# Patient Record
Sex: FEMALE | Race: WHITE | NOT HISPANIC OR LATINO
[De-identification: names, ages, dates, MRNs, and addresses within clinical notes are randomized per-mention and may not be internally consistent; named-entity substitution may affect disease eponyms.]

---

## 2022-02-23 PROBLEM — Z00.00 ENCOUNTER FOR PREVENTIVE HEALTH EXAMINATION: Status: ACTIVE | Noted: 2022-02-23

## 2022-02-25 ENCOUNTER — APPOINTMENT (OUTPATIENT)
Dept: ENDOCRINOLOGY | Facility: CLINIC | Age: 22
End: 2022-02-25
Payer: COMMERCIAL

## 2022-02-25 VITALS
HEIGHT: 66 IN | OXYGEN SATURATION: 97 % | SYSTOLIC BLOOD PRESSURE: 110 MMHG | WEIGHT: 143 LBS | BODY MASS INDEX: 22.98 KG/M2 | DIASTOLIC BLOOD PRESSURE: 70 MMHG | HEART RATE: 102 BPM

## 2022-02-25 DIAGNOSIS — F41.9 ANXIETY DISORDER, UNSPECIFIED: ICD-10-CM

## 2022-02-25 DIAGNOSIS — N92.0 EXCESSIVE AND FREQUENT MENSTRUATION WITH REGULAR CYCLE: ICD-10-CM

## 2022-02-25 PROCEDURE — 99204 OFFICE O/P NEW MOD 45 MIN: CPT

## 2022-03-10 ENCOUNTER — TRANSCRIPTION ENCOUNTER (OUTPATIENT)
Age: 22
End: 2022-03-10

## 2022-03-20 NOTE — HISTORY OF PRESENT ILLNESS
[FreeTextEntry1] : Patient here for evaluation of her thyroid.\par Patient concerned because sister just diagnosed with extensive papillary thyroid cancer and patient also has an aunt who has thyroid cancer.\par \par In addition patient concerned about weight gain.  And very sensitive to many different medications\par Patient reports to be feeling overall okay\par Past medical history\par Anxiety\par Depression\par Asthma\par Eczema\par Concussion in January 2018\par Vertigo\par GERD without esophagitis, intermittent laryngospasm from LPR and GERD\par MTHFR mutation\par \par \par GYN history gynn   \par Patient went through normal puberty, menarche age 12 cycles initially every 40 days with OCP became regular patient has been off OCP recently and the periods are regular but heavy, OCPs have caused mood swings for the patient\par Past surgical history cauterization of left nostril with Dr. Bennett\par Laparoscopy for possible endometriosis which turned out to be negative\par \par Family history \par MALIGNANT hyperthermia severe reaction to volatile anesthetic agents and succinylcholine neuromuscular blocking agent\par Father obesity, skin cancer\par Mother obesity prediabetes\par Sister thyroid cancer\par Brother healthy\par Grandfather diabetes pancreatic cancer\par Aunt thyroid cancer\par Other grandfather prostate cancer\par Uncle bladder cancer\par \par Social history patient was born and raised in [place]\par No exposures to chemicals or radiation\par Patient is non-smoker\par Social alcohol use\par \par Allergies include\par Cefdinir\par Hibiclens\par Eggs\par Nuts\par Tree nuts including Shea\par Cherries\par Zantac\par Atrovent, Combivent\par Low Loestrin Fe if severe mood swings\par \par \par Medications\par Buspirone 15 mg twice daily\par Claritin 10 mg once per day\par Lexapro 10 mg once per day\par Folic acid\par Iron\par Sertraline 50 mg once per day\par Vitamin D 2000 IUs once per day\par As needed medications include\par Clonazepam 0.25 mg 3 times daily as needed\par Meclizine 12.5 mg 3 times daily as needed vertigo\par Naproxen sodium 550 mg every 8-12 hours as needed menstrual pain\par Benadryl 12.5 to 25 mg every 4 hour as needed allergic reaction\par ProAir every 4 hours as needed\par EpiPen as needed for anaphylaxis\par

## 2022-03-20 NOTE — ASSESSMENT
[FreeTextEntry1] : Patient with family history of thyroid cancer here for evaluation for thyroid as well as weight gain\par 1.  Thyroid\par Check thyroid ultrasound\par Check thyroid antibodies including antithyroglobulin and antithyroperoxidase, as well as TSI\par \par Weight gain\par Patient off OCP\par Check full hormonal panel

## 2022-03-20 NOTE — PHYSICAL EXAM
[Alert] : alert [Well Nourished] : well nourished [Well Developed] : well developed [No Acute Distress] : no acute distress [Normal Sclera/Conjunctiva] : normal sclera/conjunctiva [EOMI] : extra ocular movement intact [No Proptosis] : no proptosis [Normal Oropharynx] : the oropharynx was normal [Thyroid Not Enlarged] : the thyroid was not enlarged [No Thyroid Nodules] : no palpable thyroid nodules [No Accessory Muscle Use] : no accessory muscle use [No Respiratory Distress] : no respiratory distress [Clear to Auscultation] : lungs were clear to auscultation bilaterally [Normal S1, S2] : normal S1 and S2 [Normal Rate] : heart rate was normal [Regular Rhythm] : with a regular rhythm [No Edema] : no peripheral edema [Pedal Pulses Normal] : the pedal pulses are present [Normal Anterior Cervical Nodes] : no anterior cervical lymphadenopathy [Normal Posterior Cervical Nodes] : no posterior cervical lymphadenopathy [No Spinal Tenderness] : no spinal tenderness [Spine Straight] : spine straight [No Stigmata of Cushings Syndrome] : no stigmata of Cushings Syndrome [Normal Gait] : normal gait [Normal Strength/Tone] : muscle strength and tone were normal [No Rash] : no rash [Acanthosis Nigricans] : no acanthosis nigricans [Normal Reflexes] : deep tendon reflexes were 2+ and symmetric [No Tremors] : no tremors [Oriented x3] : oriented to person, place, and time

## 2022-04-02 ENCOUNTER — TRANSCRIPTION ENCOUNTER (OUTPATIENT)
Age: 22
End: 2022-04-02

## 2022-04-29 ENCOUNTER — NON-APPOINTMENT (OUTPATIENT)
Age: 22
End: 2022-04-29

## 2022-07-12 ENCOUNTER — APPOINTMENT (OUTPATIENT)
Dept: ENDOCRINOLOGY | Facility: CLINIC | Age: 22
End: 2022-07-12

## 2022-07-12 VITALS
HEIGHT: 66 IN | HEART RATE: 97 BPM | OXYGEN SATURATION: 98 % | DIASTOLIC BLOOD PRESSURE: 72 MMHG | SYSTOLIC BLOOD PRESSURE: 114 MMHG | BODY MASS INDEX: 24.11 KG/M2 | WEIGHT: 150 LBS

## 2022-07-12 PROCEDURE — 99214 OFFICE O/P EST MOD 30 MIN: CPT

## 2022-07-12 RX ORDER — LORATADINE 5 MG/5 ML
10 SOLUTION, ORAL ORAL
Refills: 0 | Status: ACTIVE | COMMUNITY

## 2022-07-12 RX ORDER — MECLIZINE HYDROCHLORIDE 25 MG/1
25 TABLET ORAL
Qty: 60 | Refills: 0 | Status: ACTIVE | COMMUNITY
Start: 2022-06-06

## 2022-07-12 RX ORDER — BLOOD SUGAR DIAGNOSTIC
STRIP MISCELLANEOUS
Qty: 8 | Refills: 0 | Status: ACTIVE | COMMUNITY
Start: 2022-02-04

## 2022-07-12 RX ORDER — NORETHINDRONE 0.35 MG/1
0.35 TABLET ORAL
Qty: 28 | Refills: 0 | Status: ACTIVE | COMMUNITY
Start: 2022-03-21

## 2022-07-12 RX ORDER — PNV NO.95/FERROUS FUM/FOLIC AC 28MG-0.8MG
TABLET ORAL
Refills: 0 | Status: ACTIVE | COMMUNITY

## 2022-07-12 RX ORDER — IRON/IRON ASP GLY/FA/MV-MIN 38 125-25-1MG
TABLET ORAL
Refills: 0 | Status: ACTIVE | COMMUNITY

## 2022-07-12 RX ORDER — CLONAZEPAM 0.25 MG/1
0.25 TABLET, ORALLY DISINTEGRATING ORAL
Qty: 270 | Refills: 0 | Status: ACTIVE | COMMUNITY
Start: 2022-06-30

## 2022-07-12 RX ORDER — CHOLECALCIFEROL (VITAMIN D3) 25 MCG
TABLET ORAL
Refills: 0 | Status: ACTIVE | COMMUNITY

## 2022-07-12 NOTE — HISTORY OF PRESENT ILLNESS
[FreeTextEntry1] : follow up MNG s/p FNA of right nodule  AUS  40 % chane  indetermintate - for right lobecotmy\par \par  pt seen by Dr Tinajero [facility]  2ndopinion   found 2  small nodule s on left -  still greed for right thryoidectomy \par Patient concerned because sister just diagnosed with extensive papillary thyroid cancer and patient also has an aunt who has thyroid cancer.\par \par on Vanessa OCP now for menses --  doing ok \par Patient reports to be feeling overall okay\par Past medical history\par Anxiety\par Depression\par Asthma\par Eczema\par Concussion in January 2018\par Vertigo\par GERD without esophagitis, intermittent laryngospasm from LPR and GERD\par MTHFR mutation\par \par \par GYN history gynn   \par Patient went through normal puberty, menarche age 12 cycles initially every 40 days with OCP became regular patient has been off OCP recently and the periods are regular but heavy, OCPs have caused mood swings for the patient\par Past surgical history cauterization of left nostril with Dr. Bennett\par Laparoscopy for possible endometriosis which turned out to be negative\par \par Family history \par MALIGNANT hyperthermia severe reaction to volatile anesthetic agents and succinylcholine neuromuscular blocking agent\par Father obesity, skin cancer\par Mother obesity prediabetes\par Sister thyroid cancer\par Brother healthy\par Grandfather diabetes pancreatic cancer\par Aunt thyroid cancer\par Other grandfather prostate cancer\par Uncle bladder cancer\par \par Social history patient was born and raised in [place]\par No exposures to chemicals or radiation\par Patient is non-smoker\par Social alcohol use\par \par Allergies include\par Cefdinir\par Hibiclens\par Eggs\par Nuts\par Tree nuts including Shea\par Cherries\par Zantac\par Atrovent, Combivent\par Low Loestrin Fe if severe mood swings\par \par \par Medications\par Buspirone 15 mg twice daily\par Claritin 10 mg once per day\par Lexapro 10 mg once per day\par Folic acid\par Iron\par Sertraline 50 mg once per day\par Vitamin D 2000 IUs once per day\par As needed medications include\par Clonazepam 0.25 mg 3 times daily as needed\par Meclizine 12.5 mg 3 times daily as needed vertigo\par Naproxen sodium 550 mg every 8-12 hours as needed menstrual pain\par Benadryl 12.5 to 25 mg every 4 hour as needed allergic reaction\par ProAir every 4 hours as needed\par EpiPen as needed for anaphylaxis\par

## 2022-07-12 NOTE — ASSESSMENT
[FreeTextEntry1] : \par \par MNG -  AUS  right nodule with 40%- intermediate rissk\par   for right hemithryoidectomy with Dr Olivas\par  new noduel on left found with Dr Reza  --will get US reprot\par    will chewkc labs 4 week post op\par \par  dw ob gyn and Dr richardson stopping OCP _POP  prior  to surgery - bartkly will need to holsd 1 week  before \par \par

## 2022-09-09 ENCOUNTER — APPOINTMENT (OUTPATIENT)
Dept: ENDOCRINOLOGY | Facility: CLINIC | Age: 22
End: 2022-09-09

## 2022-09-09 VITALS
OXYGEN SATURATION: 98 % | SYSTOLIC BLOOD PRESSURE: 110 MMHG | BODY MASS INDEX: 23.79 KG/M2 | HEART RATE: 108 BPM | DIASTOLIC BLOOD PRESSURE: 70 MMHG | HEIGHT: 68 IN | WEIGHT: 157 LBS

## 2022-09-09 LAB — TSH SERPL-ACNC: 5.76

## 2022-09-09 PROCEDURE — 99214 OFFICE O/P EST MOD 30 MIN: CPT

## 2022-09-30 NOTE — ASSESSMENT
[FreeTextEntry1] : \par \par MNG -  s/p right thryoidectomy -benign pathology\par \par  mild HYpothryodism  start Syntrhoid 0.025 mg qd\par    some fatigue and anxiety- w liekly related to schedul disruption forom injury \par   willsee pMD  \par \par  dw ob gyn and Dr richardson stopping OCP _POP  prior  to surgery - liekly will need to holsd 1 week  before \par \par

## 2022-09-30 NOTE — HISTORY OF PRESENT ILLNESS
[FreeTextEntry1] : follow up MNG s/p FNA of right nodule  AUS  40 % chane  indetermintate - had right lobctomy  july 26 2022 \par  has hasd some anxiety\par \par developed injury to left leg-  now not in yesika  ahs been very upset \par not sleeping etc \par \par on Vanessa OCP now for menses --  doing ok \par Patient reports to be feeling overall okay\par Past medical history\par Anxiety\par Depression\par Asthma\par Eczema\par Concussion in January 2018\par Vertigo\par GERD without esophagitis, intermittent laryngospasm from LPR and GERD\par MTHFR mutation\par \par \par GYN history gynn   \par Patient went through normal puberty, menarche age 12 cycles initially every 40 days with OCP became regular patient has been off OCP recently and the periods are regular but heavy, OCPs have caused mood swings for the patient\par Past surgical history cauterization of left nostril with Dr. Bennett\par Laparoscopy for possible endometriosis which turned out to be negative\par \par Family history \par MALIGNANT hyperthermia severe reaction to volatile anesthetic agents and succinylcholine neuromuscular blocking agent\par Father obesity, skin cancer\par Mother obesity prediabetes\par Sister thyroid cancer\par Brother healthy\par Grandfather diabetes pancreatic cancer\par Aunt thyroid cancer\par Other grandfather prostate cancer\par Uncle bladder cancer\par \par Social history patient was born and raised in [place]\par No exposures to chemicals or radiation\par Patient is non-smoker\par Social alcohol use\par \par Allergies include\par Cefdinir\par Hibiclens\par Eggs\par Nuts\par Tree nuts including Shea\par Cherries\par Zantac\par Atrovent, Combivent\par Low Loestrin Fe if severe mood swings\par \par \par Medications\par Buspirone 15 mg twice daily\par Claritin 10 mg once per day\par Lexapro 10 mg once per day\par Folic acid\par Iron\par Sertraline 50 mg once per day\par Vitamin D 2000 IUs once per day\par As needed medications include\par Clonazepam 0.25 mg 3 times daily as needed\par Meclizine 12.5 mg 3 times daily as needed vertigo\par Naproxen sodium 550 mg every 8-12 hours as needed menstrual pain\par Benadryl 12.5 to 25 mg every 4 hour as needed allergic reaction\par ProAir every 4 hours as needed\par EpiPen as needed for anaphylaxis\par

## 2022-10-31 ENCOUNTER — APPOINTMENT (OUTPATIENT)
Dept: ENDOCRINOLOGY | Facility: CLINIC | Age: 22
End: 2022-10-31

## 2022-10-31 VITALS
OXYGEN SATURATION: 97 % | BODY MASS INDEX: 24.86 KG/M2 | HEIGHT: 68 IN | HEART RATE: 108 BPM | DIASTOLIC BLOOD PRESSURE: 70 MMHG | SYSTOLIC BLOOD PRESSURE: 110 MMHG | WEIGHT: 164 LBS

## 2022-10-31 PROCEDURE — 99214 OFFICE O/P EST MOD 30 MIN: CPT

## 2022-11-04 NOTE — ASSESSMENT
[FreeTextEntry1] : \par \par MNG -  s/p right thryoidectomy -benign pathology\par \par  mild HYpothryodism  increase to 1  tab qod alt with 2 tab qod \par  D  \par \par  \par \par

## 2022-11-04 NOTE — HISTORY OF PRESENT ILLNESS
[FreeTextEntry1] : follow up MNG s/p FNA of right nodule  AUS  40 % chane  indetermintate - had right lobctomy  july 26 2022 \par  has hasd some anxiety\par \par developed injury to left leg-  now not in yesika  ahs been very upset \par not sleeping etc \par \par on Vanessa OCP now for menses --  doing ok \par Patient reports to be feeling overall okay\par Past medical history\par Anxiety\par Depression\par Asthma\par Eczema\par Concussion in January 2018\par Vertigo\par GERD without esophagitis, intermittent laryngospasm from LPR and GERD\par MTHFR mutation\par \par \par GYN history gynn   \par Patient went through normal puberty, menarche age 12 cycles initially every 40 days with OCP became regular patient has been off OCP recently and the periods are regular but heavy, OCPs have caused mood swings for the patient\par Past surgical history cauterization of left nostril with Dr. Bennett\par Laparoscopy for possible endometriosis which turned out to be negative\par \par Family history \par MALIGNANT hyperthermia severe reaction to volatile anesthetic agents and succinylcholine neuromuscular blocking agent\par Father obesity, skin cancer\par Mother obesity prediabetes\par Sister thyroid cancer\par Brother healthy\par Grandfather diabetes pancreatic cancer\par Aunt thyroid cancer\par Other grandfather prostate cancer\par Uncle bladder cancer\par \par Social history patient was born and raised in [place]\par No exposures to chemicals or radiation\par Patient is non-smoker\par Social alcohol use\par \par Allergies include\par Cefdinir\par Hibiclens\par Eggs\par Nuts\par Tree nuts including Shea\par Cherries\par Zantac\par Atrovent, Combivent\par Low Loestrin Fe if severe mood swings\par \par \par Medications\par Buspirone 15 mg twice daily\par Claritin 10 mg once per day\par Lexapro 10 mg once per day\par Folic acid\par Iron\par Sertraline 50 mg once per day\par Vitamin D 2000 IUs once per day\par As needed medications include\par Clonazepam 0.25 mg 3 times daily as needed\par Meclizine 12.5 mg 3 times daily as needed vertigo\par Naproxen sodium 550 mg every 8-12 hours as needed menstrual pain\par Benadryl 12.5 to 25 mg every 4 hour as needed allergic reaction\par ProAir every 4 hours as needed\par EpiPen as needed for anaphylaxis\par

## 2023-01-20 ENCOUNTER — APPOINTMENT (OUTPATIENT)
Dept: RHEUMATOLOGY | Facility: CLINIC | Age: 23
End: 2023-01-20
Payer: COMMERCIAL

## 2023-01-20 ENCOUNTER — LABORATORY RESULT (OUTPATIENT)
Age: 23
End: 2023-01-20

## 2023-01-20 VITALS
DIASTOLIC BLOOD PRESSURE: 73 MMHG | SYSTOLIC BLOOD PRESSURE: 111 MMHG | WEIGHT: 170 LBS | HEIGHT: 68 IN | HEART RATE: 86 BPM | OXYGEN SATURATION: 97 % | BODY MASS INDEX: 25.76 KG/M2 | TEMPERATURE: 97.1 F

## 2023-01-20 DIAGNOSIS — M62.838 OTHER MUSCLE SPASM: ICD-10-CM

## 2023-01-20 DIAGNOSIS — Z82.61 FAMILY HISTORY OF ARTHRITIS: ICD-10-CM

## 2023-01-20 DIAGNOSIS — M25.562 PAIN IN LEFT KNEE: ICD-10-CM

## 2023-01-20 DIAGNOSIS — M89.9 DISORDER OF BONE, UNSPECIFIED: ICD-10-CM

## 2023-01-20 DIAGNOSIS — G89.29 PAIN IN LEFT KNEE: ICD-10-CM

## 2023-01-20 DIAGNOSIS — M54.2 CERVICALGIA: ICD-10-CM

## 2023-01-20 DIAGNOSIS — M94.9 DISORDER OF BONE, UNSPECIFIED: ICD-10-CM

## 2023-01-20 PROCEDURE — 99204 OFFICE O/P NEW MOD 45 MIN: CPT | Mod: 25

## 2023-01-20 PROCEDURE — 36415 COLL VENOUS BLD VENIPUNCTURE: CPT

## 2023-01-20 RX ORDER — SERTRALINE HYDROCHLORIDE 100 MG/1
100 TABLET, FILM COATED ORAL
Qty: 90 | Refills: 0 | Status: DISCONTINUED | COMMUNITY
Start: 2022-01-21 | End: 2023-01-20

## 2023-01-20 NOTE — PHYSICAL EXAM
[General Appearance - Alert] : alert [General Appearance - In No Acute Distress] : in no acute distress [Sclera] : the sclera and conjunctiva were normal [PERRL With Normal Accommodation] : pupils were equal in size, round, and reactive to light [Extraocular Movements] : extraocular movements were intact [Neck Appearance] : the appearance of the neck was normal [Neck Cervical Mass (___cm)] : no neck mass was observed [Jugular Venous Distention Increased] : there was no jugular-venous distention [Thyroid Diffuse Enlargement] : the thyroid was not enlarged [Thyroid Nodule] : there were no palpable thyroid nodules [No CVA Tenderness] : no ~M costovertebral angle tenderness [No Spinal Tenderness] : no spinal tenderness [Abnormal Walk] : normal gait [Nail Clubbing] : no clubbing  or cyanosis of the fingernails [Musculoskeletal - Swelling] : no joint swelling seen [Motor Tone] : muscle strength and tone were normal [Skin Color & Pigmentation] : normal skin color and pigmentation [Skin Turgor] : normal skin turgor [] : no rash [Oriented To Time, Place, And Person] : oriented to person, place, and time [FreeTextEntry1] : Hands-normal\par Wrists-normal\par Elbows-normal\par Shoulders-normal\par Hips-normal\par Knees-Mild L knee laxity/swelling\par Ankles-normal\par Feet-normal\par MMT 10/10 proximally/distally bilaterally.

## 2023-01-20 NOTE — HISTORY OF PRESENT ILLNESS
[FreeTextEntry1] : TANYA MUNOZ is a 23 y/o F who presents for initial evaluation of joint pain mostly affecting L knee  a/w swelling s/p bone contusion. \par \par -Currently experiencing lateral and posterior L knee pain/swelling. Pt had a bone contusion in the past. She has a difficult time straightening L knee all the way. Denies any warmth or redness around the knee. Pt reports past XR done which did not reveal any other abnormalities. States she uses ice, heat and/or Pennsaid medication with notable pain relief.Pt states she will be completing MRI of L knee next week. \par -Pt also reports neck pain a/w persisting muscle spams and stiffness. Pt showed tenderness when tilting head to the left on PE today.  States she has tried PT and received trigger injs that only gave her relief for 1-2 days. \par -Pt experiences intermittent lumbar spine pain. \par -Pt had R knee pain in the past. -Now resolved. \par -Reports recent wisdom tooth surgery completed last week. \par -No personal or family history of  or psoriasis. Denies recent tick/insect bite, UTI, STI, or acute GI illness.\par -Will order lab work tor/o any possible auto immune diseases. Pt agreed to tx plan. -Will be drawn in office today. \par PMHx:eczema, IBS, tics. \par FMHx: sister has RA. \par \par ROS: denies constitutional sxs of fever/chills, weight loss, alopecia, oral/nasal ulcers, sicca sxs, CP/SOB, hematuria/frothy urine, myalgias, Raynaud's, rash, nodules,eye issues,  or photosensitivity. \par  \par ROS remains unchanged otherwise \par \par

## 2023-01-20 NOTE — ADDENDUM
[FreeTextEntry1] : I Treasure Hodge wrote this note acting as a scribe for Dr. Francisco Colugna MD on Jan 20, 2023. \par I, Dr. Francisco Colunga MD, ordering physician, have read and attest that all the information, medical decision making and discharge instructions within are true and accurate on 01/20/2023.

## 2023-01-20 NOTE — ASSESSMENT
[FreeTextEntry1] : TANYA MUNOZ is a 21 y/o F who presents for initial evaluation of joint pain mostly affecting L knee  a/w swelling s/p bone contusion. \par 1. Pt is here for mngmt. of chronic lateral L knee pain/swelling she has experienced since having a bone contusion. She has a hard time straightening knee  but, denies any warmth or redness around area.  Pt states she was dx with tendonitis by PCP and had XR in the past which showed no other abnormalities. States she has tried Pennsaid with little relief. Pt will obtaining an MRI of L knee next week. 2. Also reports neck muscle spasms and stiffness. Pt shows tenderness neck when tilting head to the L on PE today. Pt has tried PT and injs for neck pain but, only gives her relief for 1-2 days.3. Pt also experiences intermittent lumbar spine pain. \par -No personal or family history of  or psoriasis. Denies recent tick/insect bite, UTI, STI, or acute GI illness.\par PMHx:eczema, IBS, tics. \par FMHx: sister has RA. \par \par -Serologies ordered to r/o any possible auto immune disorders w/14.3.3, Antinuclear AB CR, AVISE CTD, AVISE SLE, collagen type ll, CBC, CMP, CRP, CCP, HLA B27, lyme, miscellaneous test, parathyroid hormone, rheumatoid factor quant, darek mountain spotted fever IgG, ESR, Tick, and UA. Pt and mother agreed to plan. -Drawn in office today. \par -Pt reports MRI of L knee next week. \par \par RTO Call in 2 weeks with results.

## 2023-01-21 LAB
ALBUMIN SERPL ELPH-MCNC: 4.6 G/DL
ALP BLD-CCNC: 60 U/L
ALT SERPL-CCNC: 18 U/L
ANION GAP SERPL CALC-SCNC: 11 MMOL/L
AST SERPL-CCNC: 17 U/L
BASOPHILS # BLD AUTO: 0.04 K/UL
BASOPHILS NFR BLD AUTO: 0.6 %
BILIRUB SERPL-MCNC: 0.2 MG/DL
BUN SERPL-MCNC: 12 MG/DL
CALCIUM SERPL-MCNC: 9.4 MG/DL
CALCIUM SERPL-MCNC: 9.4 MG/DL
CHLORIDE SERPL-SCNC: 103 MMOL/L
CO2 SERPL-SCNC: 26 MMOL/L
CREAT SERPL-MCNC: 0.73 MG/DL
CRP SERPL-MCNC: 5 MG/L
EGFR: 119 ML/MIN/1.73M2
EOSINOPHIL # BLD AUTO: 0.32 K/UL
EOSINOPHIL NFR BLD AUTO: 4.7 %
ERYTHROCYTE [SEDIMENTATION RATE] IN BLOOD BY WESTERGREN METHOD: 6 MM/HR
GLUCOSE SERPL-MCNC: 98 MG/DL
HCT VFR BLD CALC: 38.9 %
HGB BLD-MCNC: 13.2 G/DL
IMM GRANULOCYTES NFR BLD AUTO: 0.3 %
LYMPHOCYTES # BLD AUTO: 2.58 K/UL
LYMPHOCYTES NFR BLD AUTO: 38.1 %
MAN DIFF?: NORMAL
MCHC RBC-ENTMCNC: 29.9 PG
MCHC RBC-ENTMCNC: 33.9 GM/DL
MCV RBC AUTO: 88.2 FL
MONOCYTES # BLD AUTO: 0.45 K/UL
MONOCYTES NFR BLD AUTO: 6.6 %
NEUTROPHILS # BLD AUTO: 3.36 K/UL
NEUTROPHILS NFR BLD AUTO: 49.7 %
PARATHYROID HORMONE INTACT: 25 PG/ML
PLATELET # BLD AUTO: 282 K/UL
POTASSIUM SERPL-SCNC: 4.3 MMOL/L
PROT SERPL-MCNC: 6.9 G/DL
RBC # BLD: 4.41 M/UL
RBC # FLD: 11.3 %
RHEUMATOID FACT SER QL: <10 IU/ML
SODIUM SERPL-SCNC: 141 MMOL/L
URATE SERPL-MCNC: 4.1 MG/DL
WBC # FLD AUTO: 6.77 K/UL

## 2023-01-22 LAB
CCP AB SER IA-ACNC: <8 UNITS
RF+CCP IGG SER-IMP: NEGATIVE

## 2023-01-23 LAB — ANACR T: NEGATIVE

## 2023-01-24 LAB
R RICKETTSI IGG CSF-ACNC: NEGATIVE
R RICKETTSI IGM CSF-ACNC: 0.36 INDEX

## 2023-01-27 LAB — HLA-B27 RELATED AG QL: NEGATIVE

## 2023-01-29 LAB — 14-3-3 ETA AG SER IA-MCNC: <0.2 NG/ML

## 2023-02-02 LAB
A PHAGOCYTOPH IGG TITR SER IF: NORMAL TITER
B BURGDOR AB SER QL IA: NEGATIVE
B MICROTI IGG TITR SER: NORMAL TITER
COLLAGEN TYPE II: 12.7 EU/ML
E CHAFFEENSIS IGG TITR SER IF: NORMAL TITER

## 2023-02-10 LAB
ANTI-BETA2 GLYCOPROTEIN 1 IGA CONCENTRATION: 1 U/ML
ANTI-BETA2 GLYCOPROTEIN 1 IGG CONCENTRATION: 2 U/ML
ANTI-BETA2 GLYCOPROTEIN 1 IGG CONCENTRATION: 2 U/ML
ANTI-BETA2 GLYCOPROTEIN 1 IGM CONCENTRATION: 5 U/ML
ANTI-BETA2 GLYCOPROTEIN 1 IGM CONCENTRATION: 5 U/ML
ANTI-C1Q IGG CONCENTRATION: 8 UNITS
ANTI-CARDIOLIPIN IGA CONCENTRATION: 2 APL
ANTI-CARDIOLIPIN IGG CONCENTRATION: 2 GPL
ANTI-CARDIOLIPIN IGG CONCENTRATION: 2 GPL
ANTI-CARDIOLIPIN IGM CONCENTRATION: 5 MPL
ANTI-CARDIOLIPIN IGM CONCENTRATION: 5 MPL
ANTI-CENP IGG CONCENTRATION: 0 U/ML
ANTI-CYCLIC CITRULLINATED PEPTIDE IGG CONCENTRATION: 2 U/ML
ANTI-DOUBLE-STRANDED DNA IGG CONCENTRATION: 41 IU/ML
ANTI-JO-1 IGG CONCENTRATION: <0.3 U/ML
ANTI-NUCLEAR ANTIBODIES - CYTOPLASMIC PATTERN: NORMAL
ANTI-NUCLEAR ANTIBODIES - PRIMARY NUCLEAR PATTERN: NORMAL
ANTI-NUCLEAR ANTIBODIES - PRIMARY PATTERN TITER: NEGATIVE
ANTI-NUCLEAR ANTIBODIES IGG CONCENTRATION: 11 UNITS
ANTI-PHOSPHATIDYLSERINE/PROTHROMBIN IGG CONCENTRATION: 10 UNITS
ANTI-PHOSPHATIDYLSERINE/PROTHROMBIN IGM CONCENTRATION: 28 UNITS
ANTI-RIBOSOMAL P IGG CONCENTRATION: <0.5 U/ML
ANTI-RNA POL III IGG CONCENTRATION: <0.7 U/ML
ANTI-RNP70 IGG CONCENTRATION: 2 U/ML
ANTI-RO52 IGG CONCENTRATION: 0 U/ML
ANTI-RO60 IGG CONCENTRATION: <0.4 U/ML
ANTI-SCL-70 IGG CONCENTRATION: 1 U/ML
ANTI-SMITH IGG CONCENTRATION: <0.7 U/ML
ANTI-SS-B (LA) IGG CONCENTRATION: 0 U/ML
ANTI-THYROGLOBULIN IGG CONCENTRATION: 14 IU/ML
ANTI-THYROID PEROXIDASE IGG CONCENTRATION: 5 IU/ML
ANTI-U1RNP IGG CONCENTRATION: 2 U/ML
AVISE LUPUS RESULT: NORMAL
B-LYMPHOCYTE-BOUND C4D (BC4D) LEVEL: NORMAL
ERYTHROCYTE-BOUND C4D (EC4D) LEVEL: 5
RHEUMATOID FACTOR (IGA) CONCENTRATION: 1 IU/ML
RHEUMATOID FACTOR (IGM) CONCENTRATION: 2 IU/ML

## 2023-03-16 ENCOUNTER — TRANSCRIPTION ENCOUNTER (OUTPATIENT)
Age: 23
End: 2023-03-16

## 2023-05-05 ENCOUNTER — APPOINTMENT (OUTPATIENT)
Dept: ENDOCRINOLOGY | Facility: CLINIC | Age: 23
End: 2023-05-05
Payer: COMMERCIAL

## 2023-05-05 VITALS
BODY MASS INDEX: 27.64 KG/M2 | SYSTOLIC BLOOD PRESSURE: 118 MMHG | HEART RATE: 91 BPM | DIASTOLIC BLOOD PRESSURE: 72 MMHG | HEIGHT: 66 IN | OXYGEN SATURATION: 98 % | WEIGHT: 172 LBS

## 2023-05-05 PROCEDURE — 99214 OFFICE O/P EST MOD 30 MIN: CPT

## 2023-05-14 NOTE — ASSESSMENT
[FreeTextEntry1] : \par \par MNG -  s/p right thryoidectomy -benign pathology\par check us now \par \par Hypothyroidsm cont LT4 25 mcg qod  alt with 50 mcg qod \par ?IR S  check insulin Cpeptide levsl \par hormonallevesl hard to checkas pt is on OCP \par  \par \par

## 2023-05-14 NOTE — HISTORY OF PRESENT ILLNESS
[FreeTextEntry1] : follow up MNG s/p FNA of right nodule  AUS  40 % chane  indetermintate - had right lobctomy  july 26 2022 \par feelign better overall \par On LT4 25 mcg qod  alt with 50 mcg qod \par developed injury to left leg-  now not in yesika  ahs been very upset \par not sleeping etc \par \par on Avnessa OCP now for menses --  doing ok \par Patient reports to be feeling overall okay\par Past medical history\par Anxiety\par Depression\par Asthma\par Eczema\par Concussion in January 2018\par Vertigo\par GERD without esophagitis, intermittent laryngospasm from LPR and GERD\par MTHFR mutation\par \par \par GYN history gynn   \par Patient went through normal puberty, menarche age 12 cycles initially every 40 days with OCP became regular patient has been off OCP recently and the periods are regular but heavy, OCPs have caused mood swings for the patient\par Past surgical history cauterization of left nostril with Dr. Bennett\par Laparoscopy for possible endometriosis which turned out to be negative\par \par Family history \par MALIGNANT hyperthermia severe reaction to volatile anesthetic agents and succinylcholine neuromuscular blocking agent\par Father obesity, skin cancer\par Mother obesity prediabetes\par Sister thyroid cancer\par Brother healthy\par Grandfather diabetes pancreatic cancer\par Aunt thyroid cancer\par Other grandfather prostate cancer\par Uncle bladder cancer\par \par Social history patient was born and raised in [place]\par No exposures to chemicals or radiation\par Patient is non-smoker\par Social alcohol use\par \par Allergies include\par Cefdinir\par Hibiclens\par Eggs\par Nuts\par Tree nuts including Shea\par Cherries\par Zantac\par Atrovent, Combivent\par Low Loestrin Fe if severe mood swings\par \par \par Medications\par Buspirone 15 mg twice daily\par Claritin 10 mg once per day\par Lexapro 10 mg once per day\par Folic acid\par Iron\par Sertraline 50 mg once per day\par Vitamin D 2000 IUs once per day\par As needed medications include\par Clonazepam 0.25 mg 3 times daily as needed\par Meclizine 12.5 mg 3 times daily as needed vertigo\par Naproxen sodium 550 mg every 8-12 hours as needed menstrual pain\par Benadryl 12.5 to 25 mg every 4 hour as needed allergic reaction\par ProAir every 4 hours as needed\par EpiPen as needed for anaphylaxis\par

## 2023-11-17 ENCOUNTER — APPOINTMENT (OUTPATIENT)
Dept: ENDOCRINOLOGY | Facility: CLINIC | Age: 23
End: 2023-11-17

## 2023-12-27 ENCOUNTER — APPOINTMENT (OUTPATIENT)
Dept: ENDOCRINOLOGY | Facility: CLINIC | Age: 23
End: 2023-12-27

## 2024-02-15 ENCOUNTER — APPOINTMENT (OUTPATIENT)
Dept: ENDOCRINOLOGY | Facility: CLINIC | Age: 24
End: 2024-02-15
Payer: COMMERCIAL

## 2024-02-15 VITALS
HEART RATE: 89 BPM | WEIGHT: 182 LBS | BODY MASS INDEX: 29.25 KG/M2 | SYSTOLIC BLOOD PRESSURE: 124 MMHG | HEIGHT: 66 IN | DIASTOLIC BLOOD PRESSURE: 82 MMHG

## 2024-02-15 DIAGNOSIS — R53.83 OTHER FATIGUE: ICD-10-CM

## 2024-02-15 DIAGNOSIS — E03.9 HYPOTHYROIDISM, UNSPECIFIED: ICD-10-CM

## 2024-02-15 DIAGNOSIS — E04.0 NONTOXIC DIFFUSE GOITER: ICD-10-CM

## 2024-02-15 PROCEDURE — 99214 OFFICE O/P EST MOD 30 MIN: CPT

## 2024-02-15 RX ORDER — ETODOLAC 500 MG/1
500 TABLET, FILM COATED, EXTENDED RELEASE ORAL
Qty: 60 | Refills: 0 | Status: DISCONTINUED | COMMUNITY
Start: 2023-03-16 | End: 2024-02-15

## 2024-02-15 RX ORDER — BUSPIRONE HYDROCHLORIDE 5 MG/1
5 TABLET ORAL
Qty: 14 | Refills: 0 | Status: DISCONTINUED | COMMUNITY
Start: 2022-06-17 | End: 2024-02-15

## 2024-02-15 RX ORDER — BUSPIRONE HYDROCHLORIDE 15 MG/1
15 TABLET ORAL
Refills: 0 | Status: DISCONTINUED | COMMUNITY
End: 2024-02-15

## 2024-02-15 RX ORDER — FLUVOXAMINE MALEATE 100 MG/1
100 CAPSULE, EXTENDED RELEASE ORAL
Qty: 30 | Refills: 0 | Status: ACTIVE | COMMUNITY
Start: 2024-02-15

## 2024-02-15 RX ORDER — ESCITALOPRAM OXALATE 20 MG/1
20 TABLET ORAL
Qty: 90 | Refills: 0 | Status: DISCONTINUED | COMMUNITY
Start: 2022-06-30 | End: 2024-02-15

## 2024-02-15 NOTE — ASSESSMENT
[Levothyroxine] : The patient was instructed to take Levothyroxine on an empty stomach, separate from vitamins, and wait at least 30 minutes before eating [FreeTextEntry1] : MNG - s/p right thyroidectomy -benign pathology Repeat u/s 5/2024  Post-Operative Hypothyroidism cont  50 mcg qd Need updated TFTs and will adjust dose if needed   RTO in 6 months with MD

## 2024-02-15 NOTE — HISTORY OF PRESENT ILLNESS
[FreeTextEntry1] : follow up MNG s/p FNA of right nodule AUS 40 % change indeterminates - had right lobectomy july 26 2022 which was benign   On  mcg Daily - takes appropriately    sleeping well now   on Vanessa OCP now for menses -- doing ok progesterone only  gets menses every 50 days   Past medical history Anxiety Depression Asthma Eczema Concussion in January 2018 Vertigo GERD without esophagitis, intermittent laryngospasm from LPR and GERD MTHFR mutation    GYN history  Patient went through normal puberty, menarche age 12 cycles initially every 40 days with OCP became regular patient has been off OCP recently and the periods are regular but heavy, OCPs have caused mood swings for the patient  Past surgical history cauterization of left nostril with Dr. Bennett Laparoscopy for possible endometriosis which turned out to be negative   Family history MALIGNANT hyperthermia severe reaction to volatile anesthetic agents and succinylcholine neuromuscular blocking agent Father obesity, skin cancer Mother obesity prediabetes Sister thyroid cancer Brother healthy Grandfather diabetes pancreatic cancer Aunt thyroid cancer Other grandfather prostate cancer Uncle bladder cancer   Social history patient was [place] No exposures to chemicals or radiation Patient is non-smoker Social alcohol use  Allergies include Cefdinir Hibiclens Eggs Nuts Tree nuts including Shea Cherries Zantac Atrovent, Combivent Low Loestrin Fe if severe mood swings     Medications Claritin 10 mg once per day Folic acid Iron Sertraline 50 mg once per day Vitamin D 2000 IUs once per day Luvox 100 mg once per day  As needed medications include Clonazepam 0.25 mg 3 times daily as needed Meclizine 12.5 mg 3 times daily as needed vertigo Naproxen sodium 550 mg every 8-12 hours as needed menstrual pain Benadryl 12.5 to 25 mg every 4 hour as needed allergic reaction ProAir every 4 hours as needed EpiPen as needed for anaphylaxis   Seeing psych every 2 months and therapost once a week   Patient c/o feeling very fatigue, she feels like she is going to fall asleep when driving to work

## 2024-02-15 NOTE — REVIEW OF SYSTEMS
[Fatigue] : fatigue [Recent Weight Gain (___ Lbs)] : recent weight gain: [unfilled] lbs [Constipation] : constipation [Visual Field Defect] : no visual field defect [Dysphagia] : no dysphagia [Chest Pain] : no chest pain [Palpitations] : no palpitations [Shortness Of Breath] : no shortness of breath [Nausea] : no nausea [Vomiting] : no vomiting [Polyuria] : no polyuria [Polydipsia] : no polydipsia [FreeTextEntry7] : has IBS

## 2024-03-13 ENCOUNTER — NON-APPOINTMENT (OUTPATIENT)
Age: 24
End: 2024-03-13

## 2024-06-10 ENCOUNTER — RX RENEWAL (OUTPATIENT)
Age: 24
End: 2024-06-10

## 2024-07-01 ENCOUNTER — RX RENEWAL (OUTPATIENT)
Age: 24
End: 2024-07-01

## 2024-08-13 ENCOUNTER — APPOINTMENT (OUTPATIENT)
Dept: ENDOCRINOLOGY | Facility: CLINIC | Age: 24
End: 2024-08-13
Payer: COMMERCIAL

## 2024-08-13 VITALS
WEIGHT: 184 LBS | HEIGHT: 66 IN | DIASTOLIC BLOOD PRESSURE: 70 MMHG | OXYGEN SATURATION: 98 % | HEART RATE: 95 BPM | SYSTOLIC BLOOD PRESSURE: 120 MMHG | BODY MASS INDEX: 29.57 KG/M2

## 2024-08-13 DIAGNOSIS — E03.9 HYPOTHYROIDISM, UNSPECIFIED: ICD-10-CM

## 2024-08-13 DIAGNOSIS — E04.0 NONTOXIC DIFFUSE GOITER: ICD-10-CM

## 2024-08-13 PROCEDURE — 99215 OFFICE O/P EST HI 40 MIN: CPT

## 2024-08-13 RX ORDER — MAGNESIUM OXIDE/MAG AA CHELATE 300 MG
CAPSULE ORAL
Refills: 0 | Status: ACTIVE | COMMUNITY

## 2024-08-13 NOTE — HISTORY OF PRESENT ILLNESS
[FreeTextEntry1] : Kendy is a 24 yr old female, seeing me frist time.  follow up MNG s/p FNA of right nodule AUS 40 % change indeterminates - had right lobectomy july 26 2022 which was BENIGN.  On LT  50 mcg Daily - takes appropriately    Currently on levothyroxine 50 mcg daily , takes it empty stomach in morning, denies missing any doses.  No dysphagia, no SOB.Denies heat or cold intolerance, no weight changes, no constipation or diarrhea, no palpitations, energy level fair, no skin or hair changes.  on Vanessa OCP progesterone only , now for menses -- now has not had cycle for 2 months. Home pregnancy test negative. No breast symptoms, no new headaches, no dizziness, no LOC.    GYN history  Patient went through normal puberty, menarche age 12 cycles initially every 40 days with OCP became regular patient has been off OCP recently and the periods are regular but heavy, OCPs have caused mood swings for the patient  Past surgical history cauterization of left nostril with Dr. Bennett Laparoscopy for possible endometriosis which turned out to be negative  Past medical history Anxiety Depression Asthma Eczema Concussion in January 2018 Vertigo GERD without esophagitis, intermittent laryngospasm from LPR and GERD MTHFR mutation

## 2024-08-13 NOTE — REVIEW OF SYSTEMS
[Recent Weight Gain (___ Lbs)] : recent weight gain: [unfilled] lbs [Constipation] : constipation [Fatigue] : no fatigue [Decreased Appetite] : appetite not decreased [Visual Field Defect] : no visual field defect [Dysphagia] : no dysphagia [Chest Pain] : no chest pain [Palpitations] : no palpitations [Shortness Of Breath] : no shortness of breath [Nausea] : no nausea [Vomiting] : no vomiting [Polyuria] : no polyuria [Joint Pain] : no joint pain [Myalgia] : no myalgia  [Acanthosis] : no acanthosis  [Headaches] : no headaches [Dizziness] : no dizziness [Tremors] : no tremors [Depression] : no depression [Insomnia] : no insomnia [Polydipsia] : no polydipsia [Cold Intolerance] : no cold intolerance [Heat Intolerance] : no heat intolerance [FreeTextEntry7] : has IBS

## 2024-08-13 NOTE — ASSESSMENT
[Levothyroxine] : The patient was instructed to take Levothyroxine on an empty stomach, separate from vitamins, and wait at least 30 minutes before eating [FreeTextEntry1] : [facility]NG - s/p right thyroidectomy -benign pathology   Post-Operative Hypothyroidism: Will check TFTs today and then adjust dose. Discussed with patient how to take thyroid medication appropriately,empty stomach , all Multi vitamins  4 to 6 hrs away form thyroid medication, he voiced understanding.   Thyroid nodules: will repeat US this visit.  No menstrual cycles for 2 months:" will check pregnancy test, prolactin, TFTs. To see her Ob.   RTO in 6 months

## 2024-08-14 ENCOUNTER — NON-APPOINTMENT (OUTPATIENT)
Age: 24
End: 2024-08-14

## 2024-08-15 LAB — TSH SERPL-ACNC: 3.23

## 2025-02-03 LAB — TSH SERPL-ACNC: 2.45

## 2025-02-05 ENCOUNTER — APPOINTMENT (OUTPATIENT)
Dept: ENDOCRINOLOGY | Facility: CLINIC | Age: 25
End: 2025-02-05
Payer: COMMERCIAL

## 2025-02-05 VITALS
SYSTOLIC BLOOD PRESSURE: 122 MMHG | HEART RATE: 80 BPM | OXYGEN SATURATION: 98 % | BODY MASS INDEX: 29.25 KG/M2 | DIASTOLIC BLOOD PRESSURE: 60 MMHG | HEIGHT: 66 IN | WEIGHT: 182 LBS

## 2025-02-05 DIAGNOSIS — E04.0 NONTOXIC DIFFUSE GOITER: ICD-10-CM

## 2025-02-05 DIAGNOSIS — E03.9 HYPOTHYROIDISM, UNSPECIFIED: ICD-10-CM

## 2025-02-05 DIAGNOSIS — Z78.9 OTHER SPECIFIED HEALTH STATUS: ICD-10-CM

## 2025-02-05 PROCEDURE — 99214 OFFICE O/P EST MOD 30 MIN: CPT

## 2025-05-14 ENCOUNTER — RX RENEWAL (OUTPATIENT)
Age: 25
End: 2025-05-14

## 2025-06-20 ENCOUNTER — RX RENEWAL (OUTPATIENT)
Age: 25
End: 2025-06-20

## 2025-08-06 ENCOUNTER — NON-APPOINTMENT (OUTPATIENT)
Age: 25
End: 2025-08-06

## 2025-08-08 ENCOUNTER — APPOINTMENT (OUTPATIENT)
Dept: ENDOCRINOLOGY | Facility: CLINIC | Age: 25
End: 2025-08-08
Payer: COMMERCIAL

## 2025-08-08 VITALS
OXYGEN SATURATION: 97 % | SYSTOLIC BLOOD PRESSURE: 120 MMHG | DIASTOLIC BLOOD PRESSURE: 70 MMHG | WEIGHT: 180 LBS | HEIGHT: 66 IN | HEART RATE: 118 BPM | BODY MASS INDEX: 28.93 KG/M2

## 2025-08-08 DIAGNOSIS — E03.9 HYPOTHYROIDISM, UNSPECIFIED: ICD-10-CM

## 2025-08-08 DIAGNOSIS — E04.0 NONTOXIC DIFFUSE GOITER: ICD-10-CM

## 2025-08-08 PROCEDURE — 99214 OFFICE O/P EST MOD 30 MIN: CPT
